# Patient Record
(demographics unavailable — no encounter records)

---

## 2020-02-24 NOTE — MRI
MR the lumbar spine with and without contrast



INDICATION: 61-year-old male with lumbar stenosis with low back pain and leg weakness



COMPARISON: MR of the lumbar spine without contrast from [facility] dated November 19
, 2014.



TECHNIQUE: Multiplanar multisequence MR images were obtained of lumbar spine with and without IV cont
rast.



Contrast: 20 cc of MultiHance.



FINDINGS:



Bone marrow: There is mild Modic endplate degenerative changes at L4-5 which are new.



Distal spinal cord and conus: Normal.  Conus is seen to terminate at the L1-L2 level.



Visualized retroperitoneum and paraspinal soft tissues: There is a 6 mm, T2 hyperintense, nonenhancin
g lesion seen within the left mid kidney likely reflective of a small left renal cyst. This was

present on a comparison CT the abdomen from prior [facility] dated July 26, 2012



Vertebral levels:



L5-S1: There is minimal interval posterior lateral spinal fusion at L5-S1. Susceptibility artifacts f
rom bilateral pedicle screws and interconnecting rods are now present. The grade 1 anterolisthesis

of L5 on S1 is stable. There is a stable mild-to-moderate broad-based pseudobulge. The loss of disc s
pace height in addition to the broad-based pseudobulge induces mild neural foraminal narrowing,

right greater than left, which is stable.



L4-5: There is a broad-based disc osteophyte complex with facet hypertrophy inducing moderate central
 canal narrowing with moderate to severe right and severe left neural foraminal narrowing. This has

progressed from the prior exam.



L3-4: There is a broad-based disc bulge with a superimposed, cephalad extending left paracentral disc
 extrusion. The extrusion measures 0.9 x 1.6 cm induces mild ventrolateral effacement of the thecal

sac. There is moderate subarticular left lateral recess narrowing due to the extrusion with potential
 for impingement of the traversing left lateral nerve roots within the thecal sac. The broad-based

disc osteophyte complex with facet hypertrophy induces mild to moderate bilateral neural foraminal na
rrowing which is relatively stable to the prior exam.



L2-3: There is a mild broad-based disc bulge but no appreciable central canal or neural foraminal vishnu
rowing.



L1-L2: There is a mild broad-based disc bulge but no appreciable central canal or neural foraminal na
rrowing



T12-L1: No appreciable central canal or neuroforaminal narrowing.



Postcontrast series: No abnormal enhancement demonstrated.





IMPRESSION:

1. Interval posterior lateral L5-S1 interbody fusion.

2. Interval development of moderate central canal narrowing at L4-5 with moderate to severe right and
 severe left neural foraminal narrowing.

3. New cephalad extending, left paracentral disc extrusion causing moderate left lateral recess narro
wing with potential for impingement of the traversing left lateral nerve roots within the thecal

sac.

4. Stable mild to moderate bilateral neural foraminal narrowing at L3-4.



Reported By: Jermain Rivero 

Electronically Signed:  2/24/2020 10:20 AM